# Patient Record
Sex: FEMALE | Race: WHITE | NOT HISPANIC OR LATINO | Employment: OTHER | RURAL
[De-identification: names, ages, dates, MRNs, and addresses within clinical notes are randomized per-mention and may not be internally consistent; named-entity substitution may affect disease eponyms.]

---

## 2021-06-07 ENCOUNTER — TRANSCRIBE ORDERS (OUTPATIENT)
Dept: PHYSICAL THERAPY | Facility: CLINIC | Age: 66
End: 2021-06-07

## 2021-06-07 DIAGNOSIS — M65.4 DE QUERVAIN'S TENOSYNOVITIS: Primary | ICD-10-CM

## 2021-06-14 ENCOUNTER — TREATMENT (OUTPATIENT)
Dept: PHYSICAL THERAPY | Facility: CLINIC | Age: 66
End: 2021-06-14

## 2021-06-14 DIAGNOSIS — M65.4 DE QUERVAIN'S TENOSYNOVITIS: Primary | ICD-10-CM

## 2021-06-14 PROCEDURE — 97110 THERAPEUTIC EXERCISES: CPT | Performed by: PHYSICAL THERAPIST

## 2021-06-14 PROCEDURE — 97161 PT EVAL LOW COMPLEX 20 MIN: CPT | Performed by: PHYSICAL THERAPIST

## 2021-06-14 NOTE — PROGRESS NOTES
Physical Therapy Initial Evaluation and Plan of Care      Patient: Pricila Camargo   : 1955  Diagnosis/ICD-10 Code:  De Quervain's tenosynovitis [M65.4]  Referring practitioner: Jerry Aragon PA-C  Date of Initial Visit: 2021  Today's Date: 2021  Patient seen for 1 sessions    Recheck due: 2021  MD appt: 6 weeks         Subjective Questionnaire: QuickDASH: 43%      Subjective Evaluation    History of Present Illness  Onset date: 7 weeks ago.  Mechanism of injury: Pt with R radial wrist pain after getting knocked in the arm by her granddaughter's james. States it wasn't a kick, it just moved it's hoof while she was tending to it and it caught her wrist. Didn't feel too bad after it happened. Also held graduation party for her granddaughter that weekend which she reports didn't help. Received a cortisone injection last week which helped tremendously. Prior to that was having a lot of pain. Yesterday the pain came back a little but not as bad. Not as swollen as it used to be and not as knotted. Was given brace but could not tolerate wearing it, MD said that was okay.      Patient Occupation: Pt is retired. 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 Pain  Current pain ratin  At best pain ratin  At worst pain ratin  Quality: dull ache and throbbing  Relieving factors: change in position and rest  Aggravating factors: prolonged positioning, repetitive movement, movement and lifting (Using knife, peeling potatoes, any repetitive movement at wrist/hand)  Progression: improved (since injection)    Social Support  Lives with: spouse    Hand dominance: right    Diagnostic Tests  X-ray: normal    Treatments  Current treatment: injection treatment  Patient Goals  Patient goals for therapy: decreased pain, increased motion, increased strength and return to sport/leisure activities             Objective          Observations     Left Wrist/Hand   Positive for Rosy's nodes.  Negative for deformity and trophic changes.     Right Wrist/Hand   Positive for Rosy's nodes. Negative for deformity and trophic changes.     Additional Wrist/Hand Observation Details  Minimal edema present at R radial wrist vs L.    Tenderness     Right Wrist/Hand   Tenderness in the first dorsal compartment, second dorsal compartment and carpometacarpal joint.     Neurological Testing     Sensation     Wrist/Hand   Left   Intact: light touch    Right   Intact: light touch    Active Range of Motion     Left Wrist   Wrist flexion: 70 degrees   Wrist extension: 52 degrees   Radial deviation: 20 degrees   Ulnar deviation: 26 degrees     Right Wrist   Wrist flexion: 65 degrees   Wrist extension: 42 degrees   Radial deviation: 12 degrees with pain  Ulnar deviation: 20 degrees with pain    Left Thumb   Extension     CMC: 45 degrees  Opposition: WNL    Right Thumb   Extension     CMC: 45 degrees  Opposition: Pain at R radial wrist with thumb to tip and MCP jt at 5th digit    Additional Active Range of Motion Details  Pain near R 1st dorsal compartment at end range radial & ulnar deviation  Pain with CMC ext on R but AROM WFL compared to opposite side. Stiff bilaterally    Strength/Myotome Testing     Left Wrist/Hand   Normal wrist strength     (2nd hand position)     Trial 1: 45 lbs    Trial 2: 40 lbs    Trial 3: 40 lbs    Average: 41.67 lbs    Right Wrist/Hand   Wrist extension: 4+  Wrist flexion: 4+     (2nd hand position)     Trial 1: 40 lbs    Trial 2: 35 lbs    Trial 3: 40 lbs    Average: 38.33 lbs    Tests     Left Elbow   Negative Tinel's sign (cubital tunnel).     Right Elbow   Negative Tinel's sign (cubital tunnel).     Left Wrist/Hand   Negative Finkelstein's, Tinel's sign (medial nerve) and Tinel's sign (radial tunnel).     Right Wrist/Hand   Positive Finkelstein's.   Negative Tinel's sign (medial nerve) and Tinel's sign (radial tunnel).     Ambulation     Comments   Non-antalgic gait with no AD.           Assessment & Plan     Assessment  Impairments: abnormal or restricted ROM, activity intolerance, impaired physical strength, lacks appropriate home exercise program and pain with function  Assessment details: Pt is a 65 y.o. R hand dominant female presenting with acute R radial wrist pain that is limiting performance of daily activities and functional mobility. Signs and symptoms most consistent with De Quervain's tenosynovitis. Pt TTP at 1st dorsal compartment of hand and at CMC joint. Pt reports receiving a cortisone injection last week which has helped significantly with her pain. Pt does have some limitations in R wrist AROM due to pain/stiffness. Pt also has arthritis and arthritic appearance in joints of digits at bilateral hands. Mild deficits in R  strength as well, mostly limited by pain. Pt did well with initial therex activities for tendon glides and wrist/hand stretching. Pt will benefit from skilled PT services to address these deficits for improvements in functional wrist ROM, fine motor coordination/dexterity,  strength, and functional activity tolerance.  Prognosis: good  Functional Limitations: carrying objects, lifting, pulling, uncomfortable because of pain and unable to perform repetitive tasks  Goals  Plan Goals: STG's by 7/5/2021  1) Demonstrate independence/compliance in performance of initial HEP  2) Demonstrate improved R wrist flex AROM to 70 deg or greater  3) Demonstrate improved R wrist ext AROM to 50 deg or greater  4) Demonstrate ability to oppose thumb to MCP jt of 5th digit without increased pain    LTG's by 7/26/2021  1) Subjectively report 60% overall improvement or greater  2) Improve QuickDASH score to 30% or less  3) Demonstrate improved R  strength to 42# or greater  4) Demonstrate improved R wrist MMT to 5/5 in all planes  5) Demonstrate improved R Radial deviation AROM to 20 deg without increased pain    Plan  Therapy options: will be seen for skilled  physical therapy services  Planned modality interventions: cryotherapy and thermotherapy (paraffin bath)  Planned therapy interventions: ADL retraining, fine motor coordination training, flexibility, functional ROM exercises, home exercise program, manual therapy, neuromuscular re-education, soft tissue mobilization, strengthening, stretching and therapeutic activities  Duration in visits: 10  Treatment plan discussed with: patient  Plan details: Continue hand 6 pack/tendon gliding and wrist/de quervain's stretching. Work on improving wrist ROM. Progress to fine motor/dexterity activities.  and functional wrist strengthening activities.        Timed:  Manual Therapy:    5     mins  86531;  Therapeutic Exercise:    16     mins  20901;     Neuromuscular Gal:        mins  96923;    Therapeutic Activity:          mins  86053;     Gait Training:           mins  60137;     Ultrasound:          mins  24560;    Electrical Stimulation:         mins  37414 ( );  Dry Needling:                     mins  self-pay;    Untimed:  Electrical Stimulation:         mins  00264 ( );  Mechanical Traction:         mins  74995;     Timed Treatment:   21   mins   Total Treatment:     39   mins    PT SIGNATURE: Maame Guevara, PT   DATE TREATMENT INITIATED: 6/14/2021    Initial Certification  Certification Period: 9/12/2021  I certify that the therapy services are furnished while this patient is under my care.  The services outlined above are required by this patient, and will be reviewed every 90 days.     PHYSICIAN: Jerry Aragon PA-C      DATE:     Please sign and return via fax to [phone].. Thank you, [facility].

## 2021-06-18 ENCOUNTER — TREATMENT (OUTPATIENT)
Dept: PHYSICAL THERAPY | Facility: CLINIC | Age: 66
End: 2021-06-18

## 2021-06-18 DIAGNOSIS — M65.4 DE QUERVAIN'S TENOSYNOVITIS: Primary | ICD-10-CM

## 2021-06-18 PROCEDURE — 97140 MANUAL THERAPY 1/> REGIONS: CPT | Performed by: PHYSICAL THERAPIST

## 2021-06-18 PROCEDURE — 97110 THERAPEUTIC EXERCISES: CPT | Performed by: PHYSICAL THERAPIST

## 2021-06-18 NOTE — PROGRESS NOTES
"   Physical Therapy Daily Treatment Note      Patient: Pricila Camargo   : 1955  Referring practitioner: Jerry Aragon PA-C  Date of Initial Visit: Type: THERAPY  Noted: 2021  Today's Date: 2021  Patient seen for 2 sessions    Recheck due: 2021  MD appt: 6 weeks         Pricila Camrago reports: \"feeling better\"        Subjective Evaluation    History of Present Illness    Subjective comment: pt states that she feels a lot better after her shot and after starting on someof the stretches Pain  Current pain ratin           Objective           General Comments     Wrist/Hand Comments  Increase pain with finklestien S     See Exercise, Manual, and Modality Logs for complete treatment.       Assessment & Plan     Assessment  Assessment details: Pt did well with treatment. Tender to both wrist flexion S as well as finkelstein S. Did well with all AROM activities. Some fatigue/lack of muscle control noted with dexaciser. Added tputty  and pinch to HEP    Goals  Plan Goals: STG's by 2021  1) Demonstrate independence/compliance in performance of initial HEP; progressing  2) Demonstrate improved R wrist flex AROM to 70 deg or greater; progrsesing  3) Demonstrate improved R wrist ext AROM to 50 deg or greater; progressing  4) Demonstrate ability to oppose thumb to MCP jt of 5th digit without increased pain; progressing    LTG's by 2021  1) Subjectively report 60% overall improvement or greater  2) Improve QuickDASH score to 30% or less  3) Demonstrate improved R  strength to 42# or greater  4) Demonstrate improved R wrist MMT to 5/5 in all planes  5) Demonstrate improved R Radial deviation AROM to 20 deg without increased pain        Plan  Duration in visits: 10  Plan details: Wrist roller H/V. Hammer pro/sup next visit        Visit Diagnoses:    ICD-10-CM ICD-9-CM   1. De Quervain's tenosynovitis  M65.4 727.04       Progress per Plan of Care and Progress strengthening " /stabilization /functional activity           Timed:  Manual Therapy:    8     mins  94384;  Therapeutic Exercise:    37     mins  91061;     Neuromuscular Gal:        mins  71198;    Therapeutic Activity:          mins  19136;     Gait Training:           mins  18450;     Ultrasound:          mins  76494;    Electrical Stimulation:         mins  59405 ( );    Untimed:  Electrical Stimulation:         mins  06536 ( );  Mechanical Traction:         mins  48801;     Timed Treatment:   45   mins   Total Treatment:     45   mins  Olive Joiner [facility]  Physical Therapist

## 2021-06-21 ENCOUNTER — TREATMENT (OUTPATIENT)
Dept: PHYSICAL THERAPY | Facility: CLINIC | Age: 66
End: 2021-06-21

## 2021-06-21 DIAGNOSIS — M65.4 DE QUERVAIN'S TENOSYNOVITIS: Primary | ICD-10-CM

## 2021-06-21 PROCEDURE — 97140 MANUAL THERAPY 1/> REGIONS: CPT | Performed by: PHYSICAL THERAPIST

## 2021-06-21 PROCEDURE — 97110 THERAPEUTIC EXERCISES: CPT | Performed by: PHYSICAL THERAPIST

## 2021-06-21 NOTE — PROGRESS NOTES
"   Physical Therapy Daily Treatment Note      Patient: Pricila Camargo   : 1955  Referring practitioner: Jerry Aragon PA-C  Date of Initial Visit: Type: THERAPY  Noted: 2021  Today's Date: 2021  Patient seen for 3 sessions     Recheck due: 2021  MD appt: 6 weeks       Pricila Camargo reports: \"better\"        Subjective Evaluation    History of Present Illness    Subjective comment: pt states that she is doing better. No pain today.Pain  Current pain ratin           Objective          Active Range of Motion     Right Wrist   Wrist flexion: 73 degrees   Wrist extension: 42 degrees   Radial deviation: 18 degrees   Ulnar deviation: 30 degrees       See Exercise, Manual, and Modality Logs for complete treatment.       Assessment & Plan     Assessment  Assessment details: Pt only complained of mild tenderness with manual. Improved in AROM measurements. Good compliance with HEP. Faituge with tputty .     Goals  Plan Goals: STG's by 2021  1) Demonstrate independence/compliance in performance of initial HEP; progressing  2) Demonstrate improved R wrist flex AROM to 70 deg or greater; progrsesing  3) Demonstrate improved R wrist ext AROM to 50 deg or greater; progressing  4) Demonstrate ability to oppose thumb to MCP jt of 5th digit without increased pain; progressing    LTG's by 2021  1) Subjectively report 60% overall improvement or greater  2) Improve QuickDASH score to 30% or less  3) Demonstrate improved R  strength to 42# or greater  4) Demonstrate improved R wrist MMT to 5/5 in all planes  5) Demonstrate improved R Radial deviation AROM to 20 deg without increased pain        Plan  Duration in visits: 10  Plan details: Address goals. Plan to D/C at end of next week.        Visit Diagnoses:    ICD-10-CM ICD-9-CM   1. De Quervain's tenosynovitis  M65.4 727.04       Progress per Plan of Care and Progress strengthening /stabilization /functional activity       "     Timed:  Manual Therapy:    8     mins  78180;  Therapeutic Exercise:    37     mins  82231;     Neuromuscular Gal:        mins  10784;    Therapeutic Activity:          mins  83183;     Gait Training:           mins  84684;     Ultrasound:          mins  96252;    Electrical Stimulation:         mins  55684 ( );    Untimed:  Electrical Stimulation:         mins  95394 ( );  Mechanical Traction:         mins  30406;     Timed Treatment:   45   mins   Total Treatment:     45   mins  Olive Joiner [facility]  Physical Therapist

## 2021-06-24 ENCOUNTER — TREATMENT (OUTPATIENT)
Dept: PHYSICAL THERAPY | Facility: CLINIC | Age: 66
End: 2021-06-24

## 2021-06-24 DIAGNOSIS — M65.4 DE QUERVAIN'S TENOSYNOVITIS: Primary | ICD-10-CM

## 2021-06-24 PROCEDURE — 97110 THERAPEUTIC EXERCISES: CPT | Performed by: PHYSICAL THERAPIST

## 2021-06-24 NOTE — PROGRESS NOTES
Physical Therapy Daily Treatment Note/ Discharge      Patient: Pricila Camargo   : 1955  Referring practitioner: No ref. provider found  Date of Initial Visit: Type: THERAPY  Noted: 2021  Today's Date: 2021  Patient seen for 4 sessions     Recheck due: 2021  MD appt: 6 weeks     Pricila Camargo reports: 95% improvement        Subjective Evaluation    History of Present Illness    Subjective comment: pt states that she feels that she can continue indpendenlty from now on and wishes to be D/C today. pt states that she has gotten a lot better and is having no pain anymore.Pain  Current pain ratin           Objective          Active Range of Motion     Right Wrist   Wrist flexion: 81 degrees   Wrist extension: 45 degrees   Radial deviation: 20 degrees   Ulnar deviation: 41 degrees     Strength/Myotome Testing     Right Wrist/Hand   Wrist extension: 5  Wrist flexion: 5  Radial deviation: 5  Ulnar deviation: 5     (2nd hand position)     Trial 1: 45 lbs    Trial 2: 45 lbs    Trial 3: 46 lbs    Average: 45.33 lbs      See Exercise, Manual, and Modality Logs for complete treatment.       Assessment & Plan     Assessment  Assessment details: Pt has met all goals set at IE except for 1. Pt having no pain and is 95% better overall. Pt verbalizes understanding of continuing HEP independently.    Goals  Plan Goals: STG's by 2021  1) Demonstrate independence/compliance in performance of initial HEP; (met)  2) Demonstrate improved R wrist flex AROM to 70 deg or greater;  met  3) Demonstrate improved R wrist ext AROM to 50 deg or greater; not met  4) Demonstrate ability to oppose thumb to MCP jt of 5th digit without increased pain; (met)    LTG's by 2021  1) Subjectively report 60% overall improvement or greater; met  2) Improve QuickDASH score to 30% or less; met  3) Demonstrate improved R  strength to 42# or greater; met  4) Demonstrate improved R wrist MMT to 5/5 in all planes;  met  5) Demonstrate improved R Radial deviation AROM to 20 deg without increased pain; met    Plan  Duration in visits: 10  Plan details: D/C to I management with HEP established.        Visit Diagnoses:    ICD-10-CM ICD-9-CM   1. De Quervain's tenosynovitis  M65.4 727.04       D/C           Timed:  Manual Therapy:         mins  87482;  Therapeutic Exercise:    45     mins  26748;     Neuromuscular Gal:        mins  55146;    Therapeutic Activity:          mins  19869;     Gait Training:           mins  19508;     Ultrasound:          mins  28253;    Electrical Stimulation:         mins  12395 ( );    Untimed:  Electrical Stimulation:         mins  31936 ( );  Mechanical Traction:         mins  20821;     Timed Treatment:   45   mins   Total Treatment:     45   mins  Olive Joiner PTA  Physical Therapist

## 2022-05-12 PROCEDURE — 88305 TISSUE EXAM BY PATHOLOGIST: CPT

## 2022-05-13 ENCOUNTER — LAB REQUISITION (OUTPATIENT)
Dept: LAB | Facility: HOSPITAL | Age: 67
End: 2022-05-13

## 2022-05-13 DIAGNOSIS — Z00.00 ENCOUNTER FOR GENERAL ADULT MEDICAL EXAMINATION WITHOUT ABNORMAL FINDINGS: ICD-10-CM

## 2022-05-17 LAB
CYTO UR: NORMAL
LAB AP CASE REPORT: NORMAL
LAB AP CLINICAL INFORMATION: NORMAL
Lab: NORMAL
PATH REPORT.FINAL DX SPEC: NORMAL
PATH REPORT.GROSS SPEC: NORMAL